# Patient Record
Sex: FEMALE | Race: WHITE | NOT HISPANIC OR LATINO | URBAN - METROPOLITAN AREA
[De-identification: names, ages, dates, MRNs, and addresses within clinical notes are randomized per-mention and may not be internally consistent; named-entity substitution may affect disease eponyms.]

---

## 2020-01-14 ENCOUNTER — OUTPATIENT (OUTPATIENT)
Dept: OUTPATIENT SERVICES | Facility: HOSPITAL | Age: 35
LOS: 1 days | End: 2020-01-14
Payer: COMMERCIAL

## 2020-01-14 VITALS
HEIGHT: 71 IN | SYSTOLIC BLOOD PRESSURE: 120 MMHG | OXYGEN SATURATION: 98 % | RESPIRATION RATE: 16 BRPM | HEART RATE: 77 BPM | TEMPERATURE: 98 F | DIASTOLIC BLOOD PRESSURE: 70 MMHG | WEIGHT: 169.98 LBS

## 2020-01-14 DIAGNOSIS — M26.629 ARTHRALGIA OF TEMPOROMANDIBULAR JOINT, UNSPECIFIED SIDE: ICD-10-CM

## 2020-01-14 DIAGNOSIS — Z98.890 OTHER SPECIFIED POSTPROCEDURAL STATES: Chronic | ICD-10-CM

## 2020-01-14 DIAGNOSIS — Z87.39 PERSONAL HISTORY OF OTHER DISEASES OF THE MUSCULOSKELETAL SYSTEM AND CONNECTIVE TISSUE: ICD-10-CM

## 2020-01-14 DIAGNOSIS — M27.9 DISEASE OF JAWS, UNSPECIFIED: Chronic | ICD-10-CM

## 2020-01-14 DIAGNOSIS — Z90.49 ACQUIRED ABSENCE OF OTHER SPECIFIED PARTS OF DIGESTIVE TRACT: Chronic | ICD-10-CM

## 2020-01-14 DIAGNOSIS — N83.209 UNSPECIFIED OVARIAN CYST, UNSPECIFIED SIDE: Chronic | ICD-10-CM

## 2020-01-14 LAB
ANION GAP SERPL CALC-SCNC: 13 MMO/L — SIGNIFICANT CHANGE UP (ref 7–14)
BUN SERPL-MCNC: 12 MG/DL — SIGNIFICANT CHANGE UP (ref 7–23)
CALCIUM SERPL-MCNC: 9.2 MG/DL — SIGNIFICANT CHANGE UP (ref 8.4–10.5)
CHLORIDE SERPL-SCNC: 100 MMOL/L — SIGNIFICANT CHANGE UP (ref 98–107)
CO2 SERPL-SCNC: 24 MMOL/L — SIGNIFICANT CHANGE UP (ref 22–31)
CREAT SERPL-MCNC: 0.7 MG/DL — SIGNIFICANT CHANGE UP (ref 0.5–1.3)
GLUCOSE SERPL-MCNC: 77 MG/DL — SIGNIFICANT CHANGE UP (ref 70–99)
HCG SERPL-ACNC: < 5 MIU/ML — SIGNIFICANT CHANGE UP
HCT VFR BLD CALC: 38.9 % — SIGNIFICANT CHANGE UP (ref 34.5–45)
HGB BLD-MCNC: 13.6 G/DL — SIGNIFICANT CHANGE UP (ref 11.5–15.5)
MCHC RBC-ENTMCNC: 34.1 PG — HIGH (ref 27–34)
MCHC RBC-ENTMCNC: 35 % — SIGNIFICANT CHANGE UP (ref 32–36)
MCV RBC AUTO: 97.5 FL — SIGNIFICANT CHANGE UP (ref 80–100)
NRBC # FLD: 0 K/UL — SIGNIFICANT CHANGE UP (ref 0–0)
PLATELET # BLD AUTO: 240 K/UL — SIGNIFICANT CHANGE UP (ref 150–400)
PMV BLD: 10.4 FL — SIGNIFICANT CHANGE UP (ref 7–13)
POTASSIUM SERPL-MCNC: 3.6 MMOL/L — SIGNIFICANT CHANGE UP (ref 3.5–5.3)
POTASSIUM SERPL-SCNC: 3.6 MMOL/L — SIGNIFICANT CHANGE UP (ref 3.5–5.3)
RBC # BLD: 3.99 M/UL — SIGNIFICANT CHANGE UP (ref 3.8–5.2)
RBC # FLD: 11.9 % — SIGNIFICANT CHANGE UP (ref 10.3–14.5)
SODIUM SERPL-SCNC: 137 MMOL/L — SIGNIFICANT CHANGE UP (ref 135–145)
WBC # BLD: 5.88 K/UL — SIGNIFICANT CHANGE UP (ref 3.8–10.5)
WBC # FLD AUTO: 5.88 K/UL — SIGNIFICANT CHANGE UP (ref 3.8–10.5)

## 2020-01-14 PROCEDURE — 93010 ELECTROCARDIOGRAM REPORT: CPT

## 2020-01-14 RX ORDER — SODIUM CHLORIDE 9 MG/ML
1000 INJECTION, SOLUTION INTRAVENOUS
Refills: 0 | Status: DISCONTINUED | OUTPATIENT
Start: 2020-01-21 | End: 2020-01-21

## 2020-01-14 NOTE — H&P PST ADULT - NSICDXPROBLEM_GEN_ALL_CORE_FT
PROBLEM DIAGNOSES  Problem: History of TMJ disorder  Assessment and Plan: Pt scheduled for surgery and preop instructions including instructions for taking Famotidine on the day of surgery, given verbally and with use of  written materials, and patient confirming understanding of such instructions using  teach back method.  OR Booking notified of sleep apnea and allergy to adhesives and Bactrim

## 2020-01-14 NOTE — H&P PST ADULT - NEGATIVE NEUROLOGICAL SYMPTOMS
no transient paralysis/no weakness/no paresthesias/no generalized seizures/no focal seizures/no syncope

## 2020-01-14 NOTE — H&P PST ADULT - ENMT COMMENTS
Pt c/o of chronic sinus congestion and infections - on nasal sinus rinses and antibiotics Hx of TMJ disorder - patient has chronic pain with use and difficulty opening jaw wide

## 2020-01-14 NOTE — H&P PST ADULT - HISTORY OF PRESENT ILLNESS
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

## 2020-01-14 NOTE — H&P PST ADULT - MUSCULOSKELETAL COMMENTS
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

## 2020-01-14 NOTE — H&P PST ADULT - NSICDXPASTSURGICALHX_GEN_ALL_CORE_FT
PAST SURGICAL HISTORY:  Disorder of jaw surgery to correct - 2007, 2018    H/O sinus surgery 2009, 2018    History of cholecystectomy     Ovarian cyst removal - 2007, 2012

## 2020-01-20 ENCOUNTER — TRANSCRIPTION ENCOUNTER (OUTPATIENT)
Age: 35
End: 2020-01-20

## 2020-01-21 ENCOUNTER — RESULT REVIEW (OUTPATIENT)
Age: 35
End: 2020-01-21

## 2020-01-21 ENCOUNTER — INPATIENT (INPATIENT)
Facility: HOSPITAL | Age: 35
LOS: 0 days | Discharge: HOME | End: 2020-01-22
Attending: ORAL & MAXILLOFACIAL SURGERY | Admitting: ORAL & MAXILLOFACIAL SURGERY
Payer: COMMERCIAL

## 2020-01-21 VITALS
RESPIRATION RATE: 16 BRPM | HEIGHT: 71 IN | DIASTOLIC BLOOD PRESSURE: 70 MMHG | SYSTOLIC BLOOD PRESSURE: 127 MMHG | HEART RATE: 64 BPM | WEIGHT: 169.98 LBS | OXYGEN SATURATION: 100 % | TEMPERATURE: 98 F

## 2020-01-21 DIAGNOSIS — M27.9 DISEASE OF JAWS, UNSPECIFIED: Chronic | ICD-10-CM

## 2020-01-21 DIAGNOSIS — N83.209 UNSPECIFIED OVARIAN CYST, UNSPECIFIED SIDE: Chronic | ICD-10-CM

## 2020-01-21 DIAGNOSIS — M26.629 ARTHRALGIA OF TEMPOROMANDIBULAR JOINT, UNSPECIFIED SIDE: ICD-10-CM

## 2020-01-21 DIAGNOSIS — Z98.890 OTHER SPECIFIED POSTPROCEDURAL STATES: Chronic | ICD-10-CM

## 2020-01-21 DIAGNOSIS — Z90.49 ACQUIRED ABSENCE OF OTHER SPECIFIED PARTS OF DIGESTIVE TRACT: Chronic | ICD-10-CM

## 2020-01-21 LAB
BLD GP AB SCN SERPL QL: NEGATIVE — SIGNIFICANT CHANGE UP
HCG UR QL: NEGATIVE — SIGNIFICANT CHANGE UP
RH IG SCN BLD-IMP: NEGATIVE — SIGNIFICANT CHANGE UP
RH IG SCN BLD-IMP: NEGATIVE — SIGNIFICANT CHANGE UP

## 2020-01-21 PROCEDURE — 88305 TISSUE EXAM BY PATHOLOGIST: CPT | Mod: 26

## 2020-01-21 RX ORDER — CEFAZOLIN SODIUM 1 G
2000 VIAL (EA) INJECTION EVERY 8 HOURS
Refills: 0 | Status: DISCONTINUED | OUTPATIENT
Start: 2020-01-21 | End: 2020-01-22

## 2020-01-21 RX ORDER — ONDANSETRON 8 MG/1
4 TABLET, FILM COATED ORAL ONCE
Refills: 0 | Status: COMPLETED | OUTPATIENT
Start: 2020-01-21 | End: 2020-01-21

## 2020-01-21 RX ORDER — OXYCODONE HYDROCHLORIDE 5 MG/1
5 TABLET ORAL EVERY 8 HOURS
Refills: 0 | Status: DISCONTINUED | OUTPATIENT
Start: 2020-01-21 | End: 2020-01-22

## 2020-01-21 RX ORDER — ACETAMINOPHEN 500 MG
650 TABLET ORAL EVERY 6 HOURS
Refills: 0 | Status: DISCONTINUED | OUTPATIENT
Start: 2020-01-21 | End: 2020-01-22

## 2020-01-21 RX ORDER — ONDANSETRON 8 MG/1
4 TABLET, FILM COATED ORAL EVERY 8 HOURS
Refills: 0 | Status: DISCONTINUED | OUTPATIENT
Start: 2020-01-21 | End: 2020-01-22

## 2020-01-21 RX ORDER — MORPHINE SULFATE 50 MG/1
2 CAPSULE, EXTENDED RELEASE ORAL EVERY 6 HOURS
Refills: 0 | Status: DISCONTINUED | OUTPATIENT
Start: 2020-01-21 | End: 2020-01-21

## 2020-01-21 RX ORDER — FENTANYL CITRATE 50 UG/ML
25 INJECTION INTRAVENOUS
Refills: 0 | Status: DISCONTINUED | OUTPATIENT
Start: 2020-01-21 | End: 2020-01-21

## 2020-01-21 RX ORDER — MORPHINE SULFATE 50 MG/1
2 CAPSULE, EXTENDED RELEASE ORAL ONCE
Refills: 0 | Status: DISCONTINUED | OUTPATIENT
Start: 2020-01-21 | End: 2020-01-22

## 2020-01-21 RX ORDER — IBUPROFEN 200 MG
600 TABLET ORAL EVERY 6 HOURS
Refills: 0 | Status: DISCONTINUED | OUTPATIENT
Start: 2020-01-21 | End: 2020-01-22

## 2020-01-21 RX ORDER — HYDROMORPHONE HYDROCHLORIDE 2 MG/ML
1 INJECTION INTRAMUSCULAR; INTRAVENOUS; SUBCUTANEOUS
Refills: 0 | Status: DISCONTINUED | OUTPATIENT
Start: 2020-01-21 | End: 2020-01-21

## 2020-01-21 RX ORDER — METOCLOPRAMIDE HCL 10 MG
10 TABLET ORAL EVERY 8 HOURS
Refills: 0 | Status: DISCONTINUED | OUTPATIENT
Start: 2020-01-21 | End: 2020-01-22

## 2020-01-21 RX ORDER — SODIUM CHLORIDE 9 MG/ML
1000 INJECTION, SOLUTION INTRAVENOUS
Refills: 0 | Status: DISCONTINUED | OUTPATIENT
Start: 2020-01-21 | End: 2020-01-22

## 2020-01-21 RX ADMIN — Medication 600 MILLIGRAM(S): at 19:37

## 2020-01-21 RX ADMIN — SODIUM CHLORIDE 100 MILLILITER(S): 9 INJECTION, SOLUTION INTRAVENOUS at 19:37

## 2020-01-21 RX ADMIN — ONDANSETRON 4 MILLIGRAM(S): 8 TABLET, FILM COATED ORAL at 17:41

## 2020-01-21 RX ADMIN — Medication 600 MILLIGRAM(S): at 14:12

## 2020-01-21 RX ADMIN — Medication 650 MILLIGRAM(S): at 23:29

## 2020-01-21 RX ADMIN — Medication 650 MILLIGRAM(S): at 16:55

## 2020-01-21 RX ADMIN — ONDANSETRON 4 MILLIGRAM(S): 8 TABLET, FILM COATED ORAL at 11:55

## 2020-01-21 RX ADMIN — Medication 100 MILLIGRAM(S): at 15:36

## 2020-01-21 RX ADMIN — Medication 600 MILLIGRAM(S): at 20:07

## 2020-01-21 RX ADMIN — Medication 10 MILLIGRAM(S): at 18:33

## 2020-01-21 RX ADMIN — Medication 600 MILLIGRAM(S): at 15:31

## 2020-01-21 NOTE — H&P ADULT - NSHPREVIEWOFSYSTEMS_GEN_ALL_CORE
Review of Systems:     General Symptoms	no fever; no chills  Skin Symptoms	no rash; no itching    Ophthalmologic Symptoms	no diplopia; no lacrimation L; no lacrimation R    ENMT Symptoms	no hearing difficulty; no ear pain; no tinnitus  ENMT Symptoms	sinus symptoms  nasal congestion  nasal discharge  Pt c/o of chronic sinus congestion and infections - on nasal sinus rinses and antibiotics    Respiratory and Thorax Symptoms	no wheezing; no dyspnea; no cough    Cardiovascular Symptoms	no chest pain; no palpitations; no dyspnea on exertion; no peripheral edema    Gastrointestinal Symptoms	no nausea; no vomiting; no diarrhea; no abdominal pain    Genitourinary Symptoms	no hematuria; no flank pain L; no flank pain R; no dysuria    Musculoskeletal Symptoms	no neck pain; no back pain  Symptoms	arthralgia; arthritis  Comments	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

## 2020-01-21 NOTE — H&P ADULT - NSHPPHYSICALEXAM_GEN_ALL_CORE
Physical Exam:  · Constitutional		well-developed; well-groomed; well-nourished  · Eyes	PERRL; EOMI  · ENMT	nose; mouth  · Nose	no discharge; no deviation  · Mouth	moist  · ENMT Comments	Hx of TMJ disorder - patient has chronic pain with use and difficulty opening jaw wide    · Neck	supple; no JVD    · Breasts	No masses; no nipple discharge    · Back	No deformity or limitation of movement     · Respiratory	airway patent; breath sounds equal; good air movement; respirations non-labored; clear to auscultation bilaterally    · Cardiovascular	regular rate and rhythm  no murmur   positive S1; positive S2    · Gastrointestinal	detailed exam  · GI Normal	soft; nontender; no distention; bowel sounds normal    · Genitourinary	not examined   · Rectal	not examined     · Extremities	no clubbing; no cyanosis; no pedal edema  · Vascular	detailed exam   · Radial Pulse	right normal; left normal  · Neurological	alert and oriented x 3; responds to pain; responds to verbal commands; sensation intact; normal strength  · Skin	warm and dry; color normal  · Lymph Nodes	no cervical supraclavicular lymphadenopathy palpated  · Musculoskeletal	detailed exam  · Musculoskeletal 	normal strength; decreased ROM; decreased ROM due to pain  · Musculoskeletal Comments	Pt c/o of chronic jaw pain with use  · Psychiatric	Affect and characteristics of appearance, verbalizations, behaviors are appropriate

## 2020-01-21 NOTE — H&P ADULT - HISTORY OF PRESENT ILLNESS
Patient, 34 yr old male with PMHx of mild sleep apnea, congenital jaw defect and TMJ pain/trismus presents for Bilateral TMJ Arthroplasty and Right Temporalis Flap in OR with Dr. Collier.     Allergies:  Bactrim: Drug, Hives  bacitracin: Drug, Hives  Neosporin: Drug, Rash  Adhesives: Miscellaneous, Rash

## 2020-01-21 NOTE — PROGRESS NOTE ADULT - ASSESSMENT
34 year old female 4 hours s/p bilateral TMJ arthroplasty. Patient progressing well.    Recommendations  - OMFS to follow and re-eval  - Encourage ambulation  - Encourage voiding      OMFS [id number]

## 2020-01-21 NOTE — PROGRESS NOTE ADULT - SUBJECTIVE AND OBJECTIVE BOX
34 year old female 4 hours s/p bilateral TMJ arthroplasty. Patient examined in PACU. Patient had one episode of emesis that was controlled with zofran. Patient reports their pain is well controlled. Patient has not yet voided. Patient is tolerating sips PO.    ICU Vital Signs Last 24 Hrs  T(C): 37.2 (21 Jan 2020 11:18), Max: 37.2 (21 Jan 2020 11:18)  T(F): 99 (21 Jan 2020 11:18), Max: 99 (21 Jan 2020 11:18)  HR: 69 (21 Jan 2020 17:00) (56 - 86)  BP: 122/77 (21 Jan 2020 17:00) (92/54 - 127/70)  BP(mean): 87 (21 Jan 2020 17:00) (53 - 87)  ABP: --  ABP(mean): --  RR: 14 (21 Jan 2020 17:00) (9 - 22)  SpO2: 99% (21 Jan 2020 17:00) (92% - 100%)      Physical Examination  AAO x 3 Patient resting comfortably  EOE: Jaw bra in place, ice pack to face, bilateral midface swelling, hemostatic, sutures in place, 20mm PRIYA  IOE: No swelling, Hemostatic, No deviation on opening

## 2020-01-21 NOTE — H&P ADULT - ASSESSMENT
Patient, 34 yr old male with PMHx of mild sleep apnea, congenital jaw defect and TMJ pain/trismus presents for Bilateral TMJ Arthroplasty and Right Temporalis Flap in OR with Dr. Collier.

## 2020-01-22 ENCOUNTER — TRANSCRIPTION ENCOUNTER (OUTPATIENT)
Age: 35
End: 2020-01-22

## 2020-01-22 VITALS
DIASTOLIC BLOOD PRESSURE: 64 MMHG | HEART RATE: 63 BPM | TEMPERATURE: 98 F | RESPIRATION RATE: 16 BRPM | OXYGEN SATURATION: 99 % | SYSTOLIC BLOOD PRESSURE: 112 MMHG

## 2020-01-22 RX ORDER — OXYCODONE HYDROCHLORIDE 5 MG/1
1 TABLET ORAL
Qty: 0 | Refills: 0 | DISCHARGE
Start: 2020-01-22

## 2020-01-22 RX ORDER — ONDANSETRON 8 MG/1
1 TABLET, FILM COATED ORAL
Qty: 21 | Refills: 0
Start: 2020-01-22

## 2020-01-22 RX ORDER — IBUPROFEN 200 MG
1 TABLET ORAL
Qty: 0 | Refills: 0 | DISCHARGE
Start: 2020-01-22

## 2020-01-22 RX ORDER — ACETAMINOPHEN 500 MG
2 TABLET ORAL
Qty: 0 | Refills: 0 | DISCHARGE
Start: 2020-01-22

## 2020-01-22 RX ADMIN — Medication 600 MILLIGRAM(S): at 07:35

## 2020-01-22 RX ADMIN — Medication 100 MILLIGRAM(S): at 07:36

## 2020-01-22 RX ADMIN — Medication 650 MILLIGRAM(S): at 00:00

## 2020-01-22 RX ADMIN — Medication 600 MILLIGRAM(S): at 02:02

## 2020-01-22 RX ADMIN — Medication 600 MILLIGRAM(S): at 08:05

## 2020-01-22 RX ADMIN — Medication 100 MILLIGRAM(S): at 02:02

## 2020-01-22 RX ADMIN — Medication 600 MILLIGRAM(S): at 02:32

## 2020-01-22 NOTE — DISCHARGE NOTE PROVIDER - CARE PROVIDER_API CALL
Quincy Collier (DDS)  OralMaxillofacial Surgery  [facility], [address] [id number]  Phone: [phone]  Fax: [phone]  Follow Up Time:

## 2020-01-22 NOTE — DISCHARGE NOTE PROVIDER - HOSPITAL COURSE
1/21/2020 - 34 yr old female with PMHx of mild sleep apnea, congenital jaw defect and TMJ pain/trismus presents for Bilateral TMJ Arthroplasty and Right Temporalis Flap in OR with Dr. Collier.     1/22/2020 - 34 year old female 1 day s/p bilateral TMJ arthroplasty. Patient examined on 8S. CHON overnight.  Patient had one episode of emesis that was controlled with zofran. Patient reports their pain is well controlled. Patient has voided without issues and is ambulating. Patient is tolerating 4 cups PO.

## 2020-01-22 NOTE — DISCHARGE NOTE NURSING/CASE MANAGEMENT/SOCIAL WORK - PATIENT PORTAL LINK FT
You can access the FollowMyHealth Patient Portal offered by [facility] by registering at the following website: [email or web address]. By joining [employer]’s FollowMyHealth portal, you will also be able to view your health information using other applications (apps) compatible with our system.

## 2020-01-22 NOTE — DISCHARGE NOTE NURSING/CASE MANAGEMENT/SOCIAL WORK - REASON FOR REFUSAL (REFER PATIENT TO HEALTHCARE PROVIDER FOR FOLLOW-UP):
Patient returned the phone call. Provided below information. Referred patient to . Office phone number given: [phone].   pt does not want

## 2020-01-22 NOTE — DISCHARGE NOTE PROVIDER - NSDCMRMEDTOKEN_GEN_ALL_CORE_FT
acetaminophen 325 mg oral tablet: 2 tab(s) orally every 6 hours, As needed, Mild Pain (1 - 3)  Augmentin 875 mg-125 mg oral tablet: 1 tab(s) orally every 12 hours  completed   budesonide 0.5 mg/2 mL inhalation suspension: used  in nasal wash   cefuroxime 500 mg oral tablet: 1 tab(s) orally every 12 hours  ecchinacea tea: 1  orally 2 times a week, last dose 1/1/20  Elderberry gummies: 2 tab(s) orally once a day, LAst dose 1/14/20  gentamicin topical:   ibuprofen 600 mg oral tablet: 1 tab(s) orally every 6 hours  Lillow 0.15mg-30 mcg oral tablet: 1 tab(s) orally once a day  oxyCODONE 5 mg oral tablet: 1 tab(s) orally every 8 hours, As needed, Severe Pain (7 - 10)  predniSONE 20 mg oral tablet: 1 tab(s) orally once a day  last dose 1/13/20 acetaminophen 325 mg oral tablet: 2 tab(s) orally every 6 hours, As needed, Mild Pain (1 - 3)  Augmentin 875 mg-125 mg oral tablet: 1 tab(s) orally every 12 hours  completed   budesonide 0.5 mg/2 mL inhalation suspension: used  in nasal wash   cefuroxime 500 mg oral tablet: 1 tab(s) orally every 12 hours  ecchinacea tea: 1  orally 2 times a week, last dose 1/1/20  Elderberry gummies: 2 tab(s) orally once a day, LAst dose 1/14/20  gentamicin topical:   ibuprofen 600 mg oral tablet: 1 tab(s) orally every 6 hours  Lillow 0.15mg-30 mcg oral tablet: 1 tab(s) orally once a day  nabumetone 750 mg oral tablet: 2 tab(s) orally once a day  ondansetron 8 mg oral tablet: 1 tab(s) orally 3 times a day, As Needed for nausea  oxyCODONE 5 mg oral tablet: 1 tab(s) orally every 8 hours, As needed, Severe Pain (7 - 10)  predniSONE 20 mg oral tablet: 1 tab(s) orally once a day  last dose 1/13/20

## 2020-01-22 NOTE — PROGRESS NOTE ADULT - SUBJECTIVE AND OBJECTIVE BOX
34 year old female 1 day s/p bilateral TMJ arthroplasty. Patient examined on 8S. CHON overnight.  Patient had one episode of emesis that was controlled with zofran. Patient reports their pain is well controlled. Patient has voided without issues and is ambulating. Patient is tolerating 4 cups PO    ICU Vital Signs Last 24 Hrs  T(C): 36.6 (22 Jan 2020 02:19), Max: 37.2 (21 Jan 2020 11:18)  T(F): 97.9 (22 Jan 2020 02:19), Max: 99 (21 Jan 2020 11:18)  HR: 56 (22 Jan 2020 02:19) (56 - 86)  BP: 118/70 (22 Jan 2020 02:19) (92/54 - 122/77)  BP(mean): 87 (21 Jan 2020 17:00) (53 - 87)  ABP: --  ABP(mean): --  RR: 16 (22 Jan 2020 02:19) (9 - 22)  SpO2: 99% (22 Jan 2020 02:19) (92% - 100%)        Physical Examination  AAO x 3 Patient resting comfortably  EOE: Jaw bra in place, ice pack to face, bilateral midface swelling, hemostatic, sutures in place, 20mm PRIYA  IOE: No swelling, Hemostatic, No deviation on opening

## 2020-01-22 NOTE — DISCHARGE NOTE NURSING/CASE MANAGEMENT/SOCIAL WORK - NSDCPNINST_GEN_ALL_CORE
Follow up with provider as instructed. Notify provider if you have any signs and symptoms of infection such as redness, swelling, purulent drainage, and/or persistent/worsening pain.

## 2020-01-22 NOTE — PROGRESS NOTE ADULT - ASSESSMENT
34 year old female 1 day s/p bilateral TMJ arthroplasty. Patient progressing well.    Recommendations  - OMFS to follow and re-eval  - Encourage ambulation  - Encourage voiding    OMFS [id number]

## 2020-09-21 ENCOUNTER — TRANSCRIPTION ENCOUNTER (OUTPATIENT)
Age: 35
End: 2020-09-21

## 2021-01-22 ENCOUNTER — TRANSCRIPTION ENCOUNTER (OUTPATIENT)
Age: 36
End: 2021-01-22

## 2023-02-01 NOTE — ASU PATIENT PROFILE, ADULT - IS PATIENT PREGNANT?
no [Gradual] : gradual [1] : 2 [0] : 0 [Localized] : localized [Constant] : constant [Household chores] : household chores [Leisure] : leisure [Meds] : meds [Physical therapy] : physical therapy [Nothing helps with pain getting better] : Nothing helps with pain getting better [Standing] : standing [Walking] : walking [Not working due to injury] : Work status: not working due to injury [de-identified] : 10/28/22: Bilateral foot pain left >right ongoing since 2014. Pain also into the big toes with stiffness. Pain medially into the foot. Reports difficulty getting out of bed in the morning. Has seen podiatry in the past where CSI done to both feet with transient relief. Tried shoe modifications. No n/t. No prior injuries to the feet. \par 11/9/22 f/u rodolfo L > R midfoot pain\par \par 12/2/22: L kidner procedure\par \par 12/9/22: f/u L foot; NWB in splint\par 1/4/23  f/u L foot nwb in slc+\par 2/1/23  f/u L foot wb in cam boot/arch support  PT [] : This patient has had an injection before: no [FreeTextEntry1] : left foot [FreeTextEntry9] :  tylenol [de-identified] : 12/02/2022 [de-identified] : PT TIW\par pt in a tall cam boot [de-identified] : [facility]  [de-identified] : 12-2-22

## 2024-03-20 RX ORDER — ONDANSETRON 8 MG/1
1 TABLET, FILM COATED ORAL
Qty: 0 | Refills: 0 | DISCHARGE

## 2024-03-20 RX ORDER — GENTAMICIN SULFATE 0.1 %
0 OINTMENT (GRAM) TOPICAL
Qty: 0 | Refills: 0 | DISCHARGE

## 2024-03-20 RX ORDER — CEFUROXIME AXETIL 250 MG
1 TABLET ORAL
Qty: 0 | Refills: 0 | DISCHARGE

## 2024-03-20 RX ORDER — BUDESONIDE, MICRONIZED 100 %
2 POWDER (GRAM) MISCELLANEOUS
Qty: 0 | Refills: 0 | DISCHARGE

## 2024-03-20 RX ORDER — NABUMETONE 750 MG
2 TABLET ORAL
Qty: 0 | Refills: 0 | DISCHARGE

## 2025-03-09 NOTE — H&P PST ADULT - HEIGHT IN INCHES
[facility] provides services at a reduced cost to those who are determined to be eligible through [facility]’s financial assistance program. Information regarding [facility]’s financial assistance program can be found by going to [email or web address] or by calling [phone]. 11